# Patient Record
Sex: MALE | Race: BLACK OR AFRICAN AMERICAN | NOT HISPANIC OR LATINO | URBAN - METROPOLITAN AREA
[De-identification: names, ages, dates, MRNs, and addresses within clinical notes are randomized per-mention and may not be internally consistent; named-entity substitution may affect disease eponyms.]

---

## 2020-08-11 ENCOUNTER — NEW PATIENT (OUTPATIENT)
Dept: URBAN - METROPOLITAN AREA CLINIC 96 | Facility: CLINIC | Age: 25
End: 2020-08-11

## 2020-08-11 VITALS — HEIGHT: 60 IN

## 2020-08-11 DIAGNOSIS — H25.813: ICD-10-CM

## 2020-08-11 DIAGNOSIS — E10.3591: ICD-10-CM

## 2020-08-11 DIAGNOSIS — H43.12: ICD-10-CM

## 2020-08-11 DIAGNOSIS — E10.3532: ICD-10-CM

## 2020-08-11 PROCEDURE — 76512 OPH US DX B-SCAN: CPT

## 2020-08-11 PROCEDURE — 92202 OPSCPY EXTND ON/MAC DRAW: CPT

## 2020-08-11 PROCEDURE — 92134 CPTRZ OPH DX IMG PST SGM RTA: CPT

## 2020-08-11 PROCEDURE — 99244 OFF/OP CNSLTJ NEW/EST MOD 40: CPT

## 2020-08-11 ASSESSMENT — TONOMETRY
OS_IOP_MMHG: 23
OD_IOP_MMHG: 22

## 2020-08-11 ASSESSMENT — VISUAL ACUITY
OS_SC: 1/200
OD_SC: 20/25

## 2020-08-24 ENCOUNTER — FOLLOW UP (OUTPATIENT)
Dept: URBAN - METROPOLITAN AREA CLINIC 96 | Facility: CLINIC | Age: 25
End: 2020-08-24

## 2020-08-24 VITALS — HEIGHT: 60 IN

## 2020-08-24 DIAGNOSIS — E10.3591: ICD-10-CM

## 2020-08-24 DIAGNOSIS — H43.12: ICD-10-CM

## 2020-08-24 DIAGNOSIS — E10.3532: ICD-10-CM

## 2020-08-24 PROCEDURE — 92250 FUNDUS PHOTOGRAPHY W/I&R: CPT

## 2020-08-24 PROCEDURE — 67028 INJECTION EYE DRUG: CPT

## 2020-08-24 PROCEDURE — 92235 FLUORESCEIN ANGRPH MLTIFRAME: CPT

## 2020-08-24 ASSESSMENT — TONOMETRY
OD_IOP_MMHG: 15
OS_IOP_MMHG: 15

## 2020-08-24 ASSESSMENT — VISUAL ACUITY
OD_PH: 20/25
OD_SC: 20/40-1

## 2020-09-01 ENCOUNTER — SURGERY/PROCEDURE (OUTPATIENT)
Dept: URBAN - METROPOLITAN AREA MEDICAL CENTER 2 | Facility: MEDICAL CENTER | Age: 25
End: 2020-09-01

## 2020-09-01 DIAGNOSIS — E10.3591: ICD-10-CM

## 2020-09-01 DIAGNOSIS — E10.3532: ICD-10-CM

## 2020-09-01 PROCEDURE — 67228 TREATMENT X10SV RETINOPATHY: CPT | Mod: 79,RT

## 2020-09-01 PROCEDURE — 67113 REPAIR RETINAL DETACH CPLX: CPT

## 2020-09-02 ENCOUNTER — 1 DAY POST-OP (OUTPATIENT)
Dept: URBAN - METROPOLITAN AREA CLINIC 96 | Facility: CLINIC | Age: 25
End: 2020-09-02

## 2020-09-02 VITALS — HEIGHT: 60 IN

## 2020-09-02 DIAGNOSIS — E10.3532: ICD-10-CM

## 2020-09-02 DIAGNOSIS — H43.12: ICD-10-CM

## 2020-09-02 PROCEDURE — 92201 OPSCPY EXTND RTA DRAW UNI/BI: CPT

## 2020-09-02 PROCEDURE — 99024 POSTOP FOLLOW-UP VISIT: CPT

## 2020-09-02 ASSESSMENT — TONOMETRY: OS_IOP_MMHG: 17

## 2020-09-02 ASSESSMENT — VISUAL ACUITY
OD_PH: 20/30
OS_SC: 5/200
OD_SC: 20/60-2

## 2020-09-08 ENCOUNTER — POST-OP CHECK (OUTPATIENT)
Dept: URBAN - METROPOLITAN AREA CLINIC 96 | Facility: CLINIC | Age: 25
End: 2020-09-08

## 2020-09-08 VITALS — HEIGHT: 60 IN

## 2020-09-08 DIAGNOSIS — H43.12: ICD-10-CM

## 2020-09-08 DIAGNOSIS — E10.3532: ICD-10-CM

## 2020-09-08 DIAGNOSIS — E10.3591: ICD-10-CM

## 2020-09-08 PROCEDURE — 99024 POSTOP FOLLOW-UP VISIT: CPT

## 2020-09-08 PROCEDURE — 92202 OPSCPY EXTND ON/MAC DRAW: CPT

## 2020-09-08 ASSESSMENT — TONOMETRY
OS_IOP_MMHG: 18
OD_IOP_MMHG: 18

## 2020-09-08 ASSESSMENT — VISUAL ACUITY
OD_PH: 20/30-1
OS_SC: 6/200
OS_PH: 20/100
OD_SC: 20/60-2

## 2020-10-01 ENCOUNTER — POST-OP CHECK (OUTPATIENT)
Dept: URBAN - METROPOLITAN AREA CLINIC 96 | Facility: CLINIC | Age: 25
End: 2020-10-01

## 2020-10-01 VITALS — HEIGHT: 60 IN

## 2020-10-01 DIAGNOSIS — E10.3532: ICD-10-CM

## 2020-10-01 DIAGNOSIS — H43.12: ICD-10-CM

## 2020-10-01 DIAGNOSIS — H35.372: ICD-10-CM

## 2020-10-01 PROCEDURE — 92134 CPTRZ OPH DX IMG PST SGM RTA: CPT

## 2020-10-01 PROCEDURE — 92201 OPSCPY EXTND RTA DRAW UNI/BI: CPT

## 2020-10-01 PROCEDURE — 99024 POSTOP FOLLOW-UP VISIT: CPT

## 2020-10-01 ASSESSMENT — TONOMETRY
OS_IOP_MMHG: 21
OD_IOP_MMHG: 20

## 2020-10-01 ASSESSMENT — VISUAL ACUITY
OD_PH: 20/30
OS_PH: 20/125
OD_SC: 20/40-1
OS_SC: 10/200

## 2020-10-08 ENCOUNTER — POST-OP CHECK (OUTPATIENT)
Dept: URBAN - METROPOLITAN AREA CLINIC 96 | Facility: CLINIC | Age: 25
End: 2020-10-08

## 2020-10-08 VITALS — HEIGHT: 60 IN

## 2020-10-08 DIAGNOSIS — E10.3532: ICD-10-CM

## 2020-10-08 DIAGNOSIS — E10.3591: ICD-10-CM

## 2020-10-08 PROCEDURE — 99024 POSTOP FOLLOW-UP VISIT: CPT

## 2020-10-08 PROCEDURE — 92134 CPTRZ OPH DX IMG PST SGM RTA: CPT

## 2020-10-08 PROCEDURE — 92201 OPSCPY EXTND RTA DRAW UNI/BI: CPT

## 2020-10-08 ASSESSMENT — TONOMETRY
OD_IOP_MMHG: 21
OS_IOP_MMHG: 19

## 2020-10-08 ASSESSMENT — VISUAL ACUITY
OD_SC: 20/60-1
OS_SC: 8/200
OD_PH: 20/25-1

## 2020-11-05 ENCOUNTER — PROCEDURE ONLY (OUTPATIENT)
Dept: URBAN - METROPOLITAN AREA CLINIC 96 | Facility: CLINIC | Age: 25
End: 2020-11-05

## 2020-11-05 VITALS — DIASTOLIC BLOOD PRESSURE: 93 MMHG | SYSTOLIC BLOOD PRESSURE: 158 MMHG | HEIGHT: 60 IN

## 2020-11-05 DIAGNOSIS — E10.3591: ICD-10-CM

## 2020-11-05 DIAGNOSIS — E10.3532: ICD-10-CM

## 2020-11-05 PROCEDURE — 99024 POSTOP FOLLOW-UP VISIT: CPT

## 2020-11-05 PROCEDURE — 67228 TREATMENT X10SV RETINOPATHY: CPT

## 2020-11-05 PROCEDURE — 92202 OPSCPY EXTND ON/MAC DRAW: CPT

## 2020-11-05 PROCEDURE — 92134 CPTRZ OPH DX IMG PST SGM RTA: CPT

## 2020-11-05 ASSESSMENT — TONOMETRY
OS_IOP_MMHG: 28
OD_IOP_MMHG: 19

## 2020-11-05 ASSESSMENT — VISUAL ACUITY
OD_PH: 20/50
OD_SC: 20/60
OS_SC: 2/200

## 2020-12-03 ENCOUNTER — FOLLOW UP (OUTPATIENT)
Dept: URBAN - METROPOLITAN AREA CLINIC 96 | Facility: CLINIC | Age: 25
End: 2020-12-03

## 2020-12-03 VITALS — HEIGHT: 60 IN

## 2020-12-03 DIAGNOSIS — E10.3591: ICD-10-CM

## 2020-12-03 DIAGNOSIS — H40.052: ICD-10-CM

## 2020-12-03 DIAGNOSIS — E10.3532: ICD-10-CM

## 2020-12-03 PROCEDURE — 92202 OPSCPY EXTND ON/MAC DRAW: CPT

## 2020-12-03 PROCEDURE — 99214 OFFICE O/P EST MOD 30 MIN: CPT

## 2020-12-03 PROCEDURE — 92134 CPTRZ OPH DX IMG PST SGM RTA: CPT

## 2020-12-03 ASSESSMENT — VISUAL ACUITY
OS_SC: 3/200
OD_PH: 20/40-1
OD_SC: 20/125

## 2020-12-03 ASSESSMENT — TONOMETRY
OS_IOP_MMHG: 20
OD_IOP_MMHG: 19

## 2021-03-04 ENCOUNTER — FOLLOW UP (OUTPATIENT)
Dept: URBAN - METROPOLITAN AREA CLINIC 96 | Facility: CLINIC | Age: 26
End: 2021-03-04

## 2021-03-04 VITALS — HEIGHT: 60 IN

## 2021-03-04 DIAGNOSIS — H35.372: ICD-10-CM

## 2021-03-04 DIAGNOSIS — E10.3532: ICD-10-CM

## 2021-03-04 DIAGNOSIS — E10.3591: ICD-10-CM

## 2021-03-04 PROCEDURE — 92014 COMPRE OPH EXAM EST PT 1/>: CPT

## 2021-03-04 PROCEDURE — 92134 CPTRZ OPH DX IMG PST SGM RTA: CPT

## 2021-03-04 PROCEDURE — 92202 OPSCPY EXTND ON/MAC DRAW: CPT

## 2021-03-04 ASSESSMENT — VISUAL ACUITY
OD_PH: 20/40
OS_SC: 2/200
OD_SC: 20/80

## 2021-03-04 ASSESSMENT — TONOMETRY
OS_IOP_MMHG: 19
OD_IOP_MMHG: 17

## 2021-05-06 ENCOUNTER — FOLLOW UP (OUTPATIENT)
Dept: URBAN - METROPOLITAN AREA CLINIC 96 | Facility: CLINIC | Age: 26
End: 2021-05-06

## 2021-05-06 VITALS — HEIGHT: 60 IN

## 2021-05-06 DIAGNOSIS — E10.3532: ICD-10-CM

## 2021-05-06 DIAGNOSIS — H35.372: ICD-10-CM

## 2021-05-06 DIAGNOSIS — E10.3591: ICD-10-CM

## 2021-05-06 PROCEDURE — 92134 CPTRZ OPH DX IMG PST SGM RTA: CPT

## 2021-05-06 PROCEDURE — 92202 OPSCPY EXTND ON/MAC DRAW: CPT

## 2021-05-06 PROCEDURE — 92014 COMPRE OPH EXAM EST PT 1/>: CPT

## 2021-05-06 ASSESSMENT — VISUAL ACUITY
OD_PH: 20/30
OS_SC: 4/200
OD_SC: 20/40-1
OS_PH: 20/150

## 2021-05-06 ASSESSMENT — TONOMETRY
OD_IOP_MMHG: 19
OS_IOP_MMHG: 20

## 2021-12-28 ENCOUNTER — OFFICE VISIT (OUTPATIENT)
Dept: URBAN - METROPOLITAN AREA CLINIC 91 | Facility: CLINIC | Age: 26
End: 2021-12-28
Payer: MEDICARE

## 2021-12-28 DIAGNOSIS — H43.11 VITREOUS HEMORRHAGE, RIGHT EYE: Primary | ICD-10-CM

## 2021-12-28 DIAGNOSIS — H25.813 COMBINED FORMS OF AGE-RELATED CATARACT, BILATERAL: ICD-10-CM

## 2021-12-28 DIAGNOSIS — E10.3593 TYPE 1 DIABETES MELLITUS W/ PROLIFERATIVE DIABETIC RETINOPATHY W/O MACULAR EDEMA, BILATERAL: ICD-10-CM

## 2021-12-28 PROCEDURE — 99204 OFFICE O/P NEW MOD 45 MIN: CPT | Performed by: OPHTHALMOLOGY

## 2021-12-28 ASSESSMENT — INTRAOCULAR PRESSURE
OS: 24
OD: 22

## 2021-12-28 NOTE — IMPRESSION/PLAN
Impression: Combined forms of age-related cataract, bilateral: H25.813. Plan: Due to the fact that cataract is minimally affecting patient's vision and that he has likely active PDR OD given new onset VH, I would not recommend surgical intervention at this time. Patient was advised to consider using UVB sunglasses when outdoors. We will consider cataract surgery at later time if patient's visual function no longer meets their needs or interferes with their daily activities.

## 2021-12-28 NOTE — IMPRESSION/PLAN
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 Plan: Patient with hx of PDR treated with PRP. Today he presents with new VH OD. We discussed that this may be due to recurrence of Proliferative disease, as such we will refer to retina for further evaluation and treatment options. It was emphasized that they need to maintain strict BS control and to return to clinic ASAP if new vision changes are noted. Close monitoring with periodic dilated exams was also emphasized.

## 2021-12-28 NOTE — IMPRESSION/PLAN
Impression: Vitreous hemorrhage, right eye: H43.11. Plan: New VH secondary to diabetic retinopathy. We will refer to retina for further evaluation and treatment options.

## (undated) RX ORDER — OFLOXACIN 3 MG/ML: 1 SOLUTION/ DROPS OPHTHALMIC